# Patient Record
(demographics unavailable — no encounter records)

---

## 2024-11-14 NOTE — PHYSICAL EXAM
[Normal] : soft, non-tender, non-distended, no masses palpated, no HSM and normal bowel sounds [Normal Gait] : normal gait [Normal Affect] : the affect was normal

## 2025-01-06 NOTE — REVIEW OF SYSTEMS
[Patient Intake Form Reviewed] : Patient intake form was reviewed [Skin Rash] : skin rash [Negative] : Allergic/Immunologic [FreeTextEntry2] : weight gain [de-identified] : brain fog, increase in headaches [de-identified] : Difficulty sleeping [de-identified] : Intolerant to cold

## 2025-01-06 NOTE — REVIEW OF SYSTEMS
[Patient Intake Form Reviewed] : Patient intake form was reviewed [Skin Rash] : skin rash [Negative] : Allergic/Immunologic [FreeTextEntry2] : weight gain [de-identified] : brain fog, increase in headaches [de-identified] : Difficulty sleeping [de-identified] : Intolerant to cold

## 2025-01-06 NOTE — ASSESSMENT
[FreeTextEntry1] : Patient is a 52 y.o. with an iron deficiency anemia since at least 2023.  Etiology attributed to heavy periods. She has had colonoscopy 5/21/24 that was normal.  Patient intolerant to PO iron.  She did have IV iron 12/4/24 - records obtained - she had 100mg of "iron" - most likely had Venofer.  Will repeat labs today to see how much of an improvement this made.  In the meantime, she will still need IV iron - recommend Feraheme x 2, then labs 1 month after to assess response.  R/B IV iron discussed; consent form signed.   All questions answered.   Curtis Duarte NP (PCP) Dr. Paras Rainey (GI)

## 2025-01-06 NOTE — HISTORY OF PRESENT ILLNESS
[de-identified] : HUBER FELDMAN is a 52 y.o. 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   01/03/17 - Hgb: 13.4, MCV: 87.8, Ferritin: 39.4 TSAT: 24%, B12: 594  05/11/23 - Hgb:   9.5, MCV: 76.7, Ferritin: 10,   TSAT:   5%  08/10/23 - Hgb: 10.0, MCV: 75.6, Ferritin:   8,   TSAT:   3%  10/25/23 - Hgb: 11.0, MCV: 80.3, Ferritin: 14,   TSAT:   8%  06/25/24 - Hgb:   9.5, MCV: 75.4, Ferritin:   7,   TSAT:   4%, B12: 556, Folate: >20 11/19/14 - Hgb:   9.3, MCV: 70.9, Ferritin:   6,   TSAT:   7%, B12: 770, Folate: 18.6  Patient has had 3 pregnancies - 2 normal deliveries and 1 miscarriage.  She is still getting her periods; states have been heavier for last 1 1/2 years. She denies any rectal or other bleeding.  5/21/24 - Colonoscopy (Dr. Paras Rainey) - Normal mucosa in the whole colon. Normal mucosa in the terminal ileum. Patient states she is unable to tolerate PO iron 2nd constipation.  She has never had a blood transfusion. She denies a personal or family hx of bleeding or clotting disorders. She states she used to donate blood many years ago.  She did have IV iron on 12/4/24 - went to [facility] on [facility]. States they gave her IV iron x 1.  She felt better for about a week. Then felt fatigued again. Her other symptoms include brain fog and increase in headaches. She denies pica or RLS.  She does have wes-menopausal symptoms - difficulty sleeping and weight gain.

## 2025-01-06 NOTE — HISTORY OF PRESENT ILLNESS
[de-identified] : HUBER FELDMAN is a 52 y.o. 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   01/03/17 - Hgb: 13.4, MCV: 87.8, Ferritin: 39.4 TSAT: 24%, B12: 594  05/11/23 - Hgb:   9.5, MCV: 76.7, Ferritin: 10,   TSAT:   5%  08/10/23 - Hgb: 10.0, MCV: 75.6, Ferritin:   8,   TSAT:   3%  10/25/23 - Hgb: 11.0, MCV: 80.3, Ferritin: 14,   TSAT:   8%  06/25/24 - Hgb:   9.5, MCV: 75.4, Ferritin:   7,   TSAT:   4%, B12: 556, Folate: >20 11/19/14 - Hgb:   9.3, MCV: 70.9, Ferritin:   6,   TSAT:   7%, B12: 770, Folate: 18.6  Patient has had 3 pregnancies - 2 normal deliveries and 1 miscarriage.  She is still getting her periods; states have been heavier for last 1 1/2 years. She denies any rectal or other bleeding.  5/21/24 - Colonoscopy (Dr. Paras Rainey) - Normal mucosa in the whole colon. Normal mucosa in the terminal ileum. Patient states she is unable to tolerate PO iron 2nd constipation.  She has never had a blood transfusion. She denies a personal or family hx of bleeding or clotting disorders. She states she used to donate blood many years ago.  She did have IV iron on 12/4/24 - went to [facility] on [facility]. States they gave her IV iron x 1.  She felt better for about a week. Then felt fatigued again. Her other symptoms include brain fog and increase in headaches. She denies pica or RLS.  She does have wes-menopausal symptoms - difficulty sleeping and weight gain.

## 2025-06-28 NOTE — HISTORY OF PRESENT ILLNESS
[FreeTextEntry1] : CPE [de-identified] : 53yo female with PMH of HTN, prediabetes, iron deficiency anemia, eczema, asthma who presents to the office for annual physical examination.   She feels well today.  She has multiple bug bites over her body.   She is struggling to lose weight, has been limiting carbs and eating more vegetables.   Underwent colonoscopy in 5/2024 which was normal.   Follows with GYN, last seen in 08/2024 and had normal pap smear at that time. Endorses change in menstruation, some missed periods. She is contemplating HRT.  Had normal mammogram in 07/2024. Will make appointment for repeat mammogram.

## 2025-06-28 NOTE — PHYSICAL EXAM
[No Acute Distress] : no acute distress [Well-Appearing] : well-appearing [Normal Sclera/Conjunctiva] : normal sclera/conjunctiva [EOMI] : extraocular movements intact [Normal Outer Ear/Nose] : the outer ears and nose were normal in appearance [Normal Oropharynx] : the oropharynx was normal [No Lymphadenopathy] : no lymphadenopathy [Supple] : supple [Thyroid Normal, No Nodules] : the thyroid was normal and there were no nodules present [No Respiratory Distress] : no respiratory distress  [Clear to Auscultation] : lungs were clear to auscultation bilaterally [Normal Rate] : normal rate  [Regular Rhythm] : with a regular rhythm [Normal S1, S2] : normal S1 and S2 [Soft] : abdomen soft [Non Tender] : non-tender [Non-distended] : non-distended [No Joint Swelling] : no joint swelling [Grossly Normal Strength/Tone] : grossly normal strength/tone [Coordination Grossly Intact] : coordination grossly intact [No Focal Deficits] : no focal deficits [Normal Gait] : normal gait [Normal Affect] : the affect was normal [Normal Insight/Judgement] : insight and judgment were intact [de-identified] : erythematous patches of bilateral upper extremities and chest

## 2025-06-28 NOTE — ASSESSMENT
[Vaccines Reviewed] : Immunizations reviewed today. Please see immunization details in the vaccine log within the immunization flowsheet.  [FreeTextEntry1] : #HCM - Patient presenting for annual physical exam - Declines flu vaccine today - Up to date with pap smear - Due to repeat mammogram, will place order in system  - Up to date with colonoscopy, repeat in 2034 - ECG today shows NSR, rate 69. Unchanged from previous ECG - Will check routine blood work today and call patient with results   #Iron deficiency anemia - Chronic - Recheck CBC and iron studies - Colonoscopy clear  #HTN - Chronic - BP improved on repeat - Continue Toprol XL 50mg daily, HTCZ 25mg daily

## 2025-06-28 NOTE — HEALTH RISK ASSESSMENT
[Good] : ~his/her~  mood as  good [No] : In the past 12 months have you used drugs other than those required for medical reasons? No [0] : 2) Feeling down, depressed, or hopeless: Not at all (0) [PHQ-2 Negative - No further assessment needed] : PHQ-2 Negative - No further assessment needed [Never] : Never [Patient reported mammogram was normal] : Patient reported mammogram was normal [Patient reported PAP Smear was normal] : Patient reported PAP Smear was normal [Patient reported colonoscopy was normal] : Patient reported colonoscopy was normal [With Significant Other] : lives with significant other [Employed] : employed [] :  [Smoke Detector] : smoke detector [Carbon Monoxide Detector] : carbon monoxide detector [Seat Belt] :  uses seat belt [Sunscreen] : uses sunscreen [de-identified] : walking with dog, dancing three days a week teached Polynesian dance, arm exercises, cardio once a week [de-identified] : reduced carbs (no rice), watching diet  [[id number]] : 0 [Reports changes in hearing] : Reports no changes in hearing [Reports changes in vision] : Reports no changes in vision [Reports changes in dental health] : Reports no changes in dental health [MammogramDate] : 07/24 [PapSmearDate] : 08/24 [ColonoscopyDate] : 05/24 [ColonoscopyComments] : Repeat 2034 [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

## 2025-06-28 NOTE — REVIEW OF SYSTEMS
[Itching] : Itching [Skin Rash] : skin rash [Fever] : no fever [Chills] : no chills [Earache] : no earache [Nasal Discharge] : no nasal discharge [Sore Throat] : no sore throat [Chest Pain] : no chest pain [Palpitations] : no palpitations [Shortness Of Breath] : no shortness of breath [Cough] : no cough [Abdominal Pain] : no abdominal pain [Nausea] : no nausea [Constipation] : no constipation [Diarrhea] : diarrhea [Vomiting] : no vomiting [Dysuria] : no dysuria [Frequency] : no frequency [Joint Pain] : no joint pain [Muscle Pain] : no muscle pain [Headache] : no headache [Dizziness] : no dizziness [Anxiety] : no anxiety [Depression] : no depression